# Patient Record
Sex: FEMALE | Race: WHITE | NOT HISPANIC OR LATINO | URBAN - METROPOLITAN AREA
[De-identification: names, ages, dates, MRNs, and addresses within clinical notes are randomized per-mention and may not be internally consistent; named-entity substitution may affect disease eponyms.]

---

## 2018-01-01 ENCOUNTER — OFFICE VISIT CONVERTED (OUTPATIENT)
Dept: OTHER | Facility: HOSPITAL | Age: 83
End: 2018-01-01
Attending: INTERNAL MEDICINE

## 2018-04-19 ENCOUNTER — OFFICE VISIT CONVERTED (OUTPATIENT)
Dept: OTHER | Facility: HOSPITAL | Age: 83
End: 2018-04-19
Attending: INTERNAL MEDICINE

## 2018-05-03 ENCOUNTER — OFFICE VISIT CONVERTED (OUTPATIENT)
Dept: OTHER | Facility: HOSPITAL | Age: 83
End: 2018-05-03
Attending: INTERNAL MEDICINE

## 2018-08-02 ENCOUNTER — OFFICE VISIT CONVERTED (OUTPATIENT)
Dept: OTHER | Facility: HOSPITAL | Age: 83
End: 2018-08-02
Attending: INTERNAL MEDICINE

## 2018-10-25 ENCOUNTER — CONVERSION ENCOUNTER (OUTPATIENT)
Dept: OTHER | Facility: HOSPITAL | Age: 83
End: 2018-10-25

## 2018-10-25 ENCOUNTER — OFFICE VISIT CONVERTED (OUTPATIENT)
Dept: OTHER | Facility: HOSPITAL | Age: 83
End: 2018-10-25
Attending: INTERNAL MEDICINE

## 2018-11-08 ENCOUNTER — OFFICE VISIT CONVERTED (OUTPATIENT)
Dept: OTHER | Facility: HOSPITAL | Age: 83
End: 2018-11-08
Attending: INTERNAL MEDICINE

## 2018-11-29 ENCOUNTER — OFFICE VISIT CONVERTED (OUTPATIENT)
Dept: OTHER | Facility: HOSPITAL | Age: 83
End: 2018-11-29
Attending: INTERNAL MEDICINE

## 2018-11-29 ENCOUNTER — CONVERSION ENCOUNTER (OUTPATIENT)
Dept: OTHER | Facility: HOSPITAL | Age: 83
End: 2018-11-29

## 2019-01-01 ENCOUNTER — OFFICE VISIT CONVERTED (OUTPATIENT)
Dept: OTHER | Facility: HOSPITAL | Age: 84
End: 2019-01-01
Attending: INTERNAL MEDICINE

## 2019-01-01 ENCOUNTER — HOSPITAL ENCOUNTER (OUTPATIENT)
Dept: PET IMAGING | Facility: HOSPITAL | Age: 84
Discharge: HOME | End: 2019-04-16
Attending: INTERNAL MEDICINE

## 2019-01-01 ENCOUNTER — CONVERSION ENCOUNTER (OUTPATIENT)
Dept: OTHER | Facility: HOSPITAL | Age: 84
End: 2019-01-01

## 2021-05-28 VITALS
WEIGHT: 142.6 LBS | HEART RATE: 88 BPM | SYSTOLIC BLOOD PRESSURE: 119 MMHG | TEMPERATURE: 97.4 F | HEIGHT: 63 IN | RESPIRATION RATE: 18 BRPM | HEIGHT: 63 IN | TEMPERATURE: 97.6 F | BODY MASS INDEX: 24.26 KG/M2 | WEIGHT: 130.06 LBS | HEIGHT: 63 IN | HEART RATE: 85 BPM | BODY MASS INDEX: 23.23 KG/M2 | TEMPERATURE: 98.5 F | OXYGEN SATURATION: 96 % | RESPIRATION RATE: 12 BRPM | HEART RATE: 84 BPM | TEMPERATURE: 98.4 F | DIASTOLIC BLOOD PRESSURE: 65 MMHG | DIASTOLIC BLOOD PRESSURE: 68 MMHG | HEIGHT: 63 IN | TEMPERATURE: 98 F | TEMPERATURE: 98.2 F | WEIGHT: 140.3 LBS | RESPIRATION RATE: 18 BRPM | OXYGEN SATURATION: 95 % | RESPIRATION RATE: 18 BRPM | BODY MASS INDEX: 24.27 KG/M2 | HEIGHT: 63 IN | SYSTOLIC BLOOD PRESSURE: 118 MMHG | OXYGEN SATURATION: 97 % | BODY MASS INDEX: 24.13 KG/M2 | WEIGHT: 131.12 LBS | SYSTOLIC BLOOD PRESSURE: 138 MMHG | HEART RATE: 71 BPM | TEMPERATURE: 97.6 F | RESPIRATION RATE: 20 BRPM | BODY MASS INDEX: 22.91 KG/M2 | TEMPERATURE: 96.5 F | BODY MASS INDEX: 25.27 KG/M2 | DIASTOLIC BLOOD PRESSURE: 64 MMHG | SYSTOLIC BLOOD PRESSURE: 148 MMHG | BODY MASS INDEX: 23.75 KG/M2 | DIASTOLIC BLOOD PRESSURE: 78 MMHG | BODY MASS INDEX: 22.15 KG/M2 | OXYGEN SATURATION: 97 % | TEMPERATURE: 98.4 F | OXYGEN SATURATION: 100 % | SYSTOLIC BLOOD PRESSURE: 161 MMHG | TEMPERATURE: 98 F | OXYGEN SATURATION: 97 % | SYSTOLIC BLOOD PRESSURE: 130 MMHG | DIASTOLIC BLOOD PRESSURE: 74 MMHG | TEMPERATURE: 90 F | WEIGHT: 136.9 LBS | WEIGHT: 125 LBS | WEIGHT: 129.31 LBS | WEIGHT: 138.5 LBS | RESPIRATION RATE: 18 BRPM | DIASTOLIC BLOOD PRESSURE: 84 MMHG | HEART RATE: 87 BPM | HEART RATE: 80 BPM | RESPIRATION RATE: 18 BRPM | HEART RATE: 74 BPM | HEART RATE: 72 BPM | SYSTOLIC BLOOD PRESSURE: 134 MMHG | BODY MASS INDEX: 23.04 KG/M2 | BODY MASS INDEX: 24.86 KG/M2 | HEART RATE: 70 BPM | HEIGHT: 63 IN | HEIGHT: 63 IN | HEIGHT: 63 IN | RESPIRATION RATE: 18 BRPM | BODY MASS INDEX: 24.54 KG/M2 | HEART RATE: 75 BPM | HEART RATE: 80 BPM | HEIGHT: 63 IN | DIASTOLIC BLOOD PRESSURE: 72 MMHG | SYSTOLIC BLOOD PRESSURE: 97 MMHG | OXYGEN SATURATION: 96 % | WEIGHT: 136.19 LBS | WEIGHT: 137 LBS | DIASTOLIC BLOOD PRESSURE: 73 MMHG | DIASTOLIC BLOOD PRESSURE: 70 MMHG | OXYGEN SATURATION: 96 % | WEIGHT: 134.06 LBS | SYSTOLIC BLOOD PRESSURE: 130 MMHG | DIASTOLIC BLOOD PRESSURE: 56 MMHG | HEIGHT: 63 IN | OXYGEN SATURATION: 97 % | SYSTOLIC BLOOD PRESSURE: 139 MMHG | OXYGEN SATURATION: 92 % | DIASTOLIC BLOOD PRESSURE: 80 MMHG | SYSTOLIC BLOOD PRESSURE: 125 MMHG

## 2021-05-28 NOTE — PROGRESS NOTES
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  PET/CT scan is ordered to verify recurrence.  Patient's present     with her caregiver.            Patient claims to have burning in her urination and not much urinary volume.      Patient drinks 4 glasses of fluid per day.            Past Med/Surg History            Past Med/Surg History:   Hypertension             No Diabetes Mellitus             Heart Disease (coronary artery disease with stent placement)             No Blood Clots             Cancer (skin cancers)             No Lung Disease             No Kidney Disease             Other (hyperlipidemia)            Social History      Social History:  No Tobacco Use, No Alcohol Use, No Recreational Drug use            Allergies      Coded Allergies:             CHOLESTYRAMINE (Unverified  Allergy, Unknown, 18)           CIPROFLOXACIN (Unverified  Allergy, Unknown, 18)           NITROFURANTOIN (Unverified  Allergy, Unknown, 18)           SUCROSE (Unverified  Allergy, Unknown, 18)            Medications      Medications    Last Reconciled on 18 14:57 by AYAKA POLO MD      Cholestyramine (Cholestyramine*) 4 Gm Powd.pack      4 GM PO BID, #60 PACKET         Reported         5/3/18       Ciprofloxacin Hcl (Ciprofloxacin*) 250 Mg Tablet      250 MG PO BID, TAB 0  Refills         Reported         5/3/18       Abbe Cit/Mag/D3/Zn//Owen/Bor (Citracal-D + Magnesium Tab) 1 Tab Tablet      1 EACH PO QDAY for 30 Days, #30 TAB         Reported         5/3/18       Glucosamine HCl (Glucosamine HCl) 500 Mg Tablet      1000 MG PO QDAY, TAB         Reported         5/22/17       Acetaminophen/Hydrocodone 10/325* (Hydrocodone/Acetaminophen 10/325*) 1 Each     Tablet      1 TAB PO Q6H Y for PAIN, TAB         Reported         5/11/17       Sennosides/Docusate Sod 8.6/50 MG (Senokot-S) 1 Each Tablet      2 TAB PO QDAY, #60 TAB         Reported         5/11/17       Polyethylene Glycol (Miralax*) 17 Gm Packet      17 GM PO QDAY, #30 PKT 0 Refills         Reported         5/11/17       Cholecalciferol (Vitamin D3*) 1,000 Unit Tab      1000 UNITS PO QDAY, #30 TAB 0 Refills         Reported         5/11/17       Magnesium Oxide (Magnesium Oxide) 500 Mg Capsule      2 TAB PO QDAY, CAP         Reported         5/11/17       Nitroglycerin (Nitromist 400 MCG/SPRAY*) 8.5 Gm Spray      0.4 MG SL ASDIR, SPRAYS         Reported         5/11/17       Zolpidem Tartrate (Ambien) 5 Mg Tablet      5 MG PO HS, #30 TAB         Reported         1/29/15       Tramadol (Tramadol) 50 Mg Tablet      50 MG PO QID for PAIN, TAB 0 Refills         Reported         1/29/15       Aspirin ([place] Aspirin*) 81 Mg Tab.chew      81 MG PO QDAY, TAB.CHEW         Reported         1/29/15       Sucralfate (Carafate) 1,000 Mg/10 Ml Oral.susp      1000 MG PO HS, #300 ML         Reported         1/29/15       Carvedilol (Coreg) 6.25 Mg Tablet      6.25 MG PO BID, #60 TAB 0 Refills         Reported         1/29/15       Gemfibrozil (Gemfibrozil*) 600 Mg Tablet      600 MG PO BID, TAB         Reported         1/29/15       Ezetimide (Zetia) 10 Mg Tablet      10 MG PO QDAY, #30 TAB 0 Refills         Reported         1/29/15       Pantoprazole (Protonix*) 40 Mg Tablet.dr      40 MG PO QDAY@07, #30 TAB 0 Refills         Reported          1/29/15      Current Medications      Current Medications Reviewed 5/3/18            Pain Assessment      Pain Intensity:  4            Review of Systems      General:  No Anxiety, Fatigue Scale: (4), Pain Scale: (4), No Fever, No Other      HEENT:  No Dysphagia, No Hearing Changes, No Rhinorrhea, No Tinnitus, No Visual     Changes, No Nasal Congestion, No Epistaxis, No Other      Respiratory:  No Cough, No Shortness of Air, No Sputum Changes, No Wheezing, No     Hemoptysis, No Congestion, No Other      Cardiovascular:  No Chest Pain, No Pedal Edema, No Orthopnea, No Palpitations,     Chest Pressure, No Dizziness, No Other      Gastrointestinal:  No Nausea, No Vomiting, No Dysphagia, Constipation, No     Diarrhea, No Appetite Good, Appetite Fair, No Appetite Poor, No Early Satiety,     No Other      Genitourinary:  Nocturia (2 or f3), Dysuria, No Other      Musculoskeletal:  No Joint Effusions, Joint Tenderness, Joint Stiffness, No     Myalgias, Aches, Pains, No Other      Endocrine:  No Heat Intolerance, No Cold Intolerance, No Fatigue, No Blood     Sugar Control, No Other      Hematologic:  Bleeding, Bruising, No Swollen Glands, No Other      Allergic/Immunologic:  No Hives, No Throat closing off, No Nasal drip, No Itchy     eyes, No Hay fever, No Other      Psychological:  No Anxiety, Depression, No Other      Neurological:  No Headaches, Dizziness, No Weakness, No Numbness, No Other      Skin:  No Rash, No Open Wounds, No Edema, No Other            Vitals      Height 5 ft 3 in / 160.02 cm      Weight 125 lbs 0 oz / 56.55504 kg      BSA 1.59 m2      BMI 22.1 kg/m2      Temperature 97.4 F / 36.33 C - Temporal      Pulse 75      Blood Pressure 134/74 Sitting, Left Arm      Pulse Oximetry 95%, room air            Exam      Constitutional:  No acute distress, Conversant, Pleasant, No Weakness      Cardiovascular:  RRR, Normal PMI, No Peripheral Edema      Lungs:  Clear to Ausculation, Normal Respiratory Effort       Extremities:  No digital cyanosis, Normal gait      Neurological:  Cranial Nerve II-XII, No Focal Sensory deficits      Psychological:  Appropriate affect, Intact judgement, Alert      Skin:  Normal temperature      Radiology Impressions      PET CT scan performed on 04/23/2016 showed stable size but slightly more solid     appearance but the patchy opacity in the apical segment of the left lower lobe     measuring 3.2 x 2.5 cm demonstrating an SUV of 3.7.  No new or enlarging     pulmonary nodule is identified.  No pathologically enlarged intrathoracic lymph     nodes.  Patient logic activity is seen in the base of the brain, and     extraocular muscles, salivary glands, the tongue, and the vocal cords.  No     abnormal FDG activity is seen in the pattern of malignant or metastatic disease     in the rest of the PET/CT scan.            Impression/Problem List      Diffuse large B-cell lymphoma involving the right mandible complete response to     6 courses of R CHOP      Hypertension      Hyperlipidemia      Coronary artery disease      Possible urinary tract infection      Notes      New Medications      * Trimethoprim/Sulfamethoxazole DS (Bactrim /160 MG*) 1 EACH TABLET: 1     TAB PO BID 7 Days #14      * Abbe Cit/Mag/D3/Zn//Owen/Bor (Citracal-D + Magnesium Tab) 1 TAB TABLET: 1     EACH PO QDAY 30 Days #30      * CIPROFLOXACIN HCL (Ciprofloxacin*) 250 MG TABLET: 250 MG PO BID      * CHOLESTYRAMINE (Cholestyramine*) 4 GM POWD.PACK: 4 GM PO BID #60            Plan      Urinalysis with culture if appropriate.  Bactrim DS 1 tablet twice a day for 7     days      CAT scan of the chest to follow-up abnormal density on the PET scan in 3 months      CBC, CMP, LDH in 3 months            Patient Education:        DI for Non-Hodgkin's Lymphoma            PREVENTION      Hx Influenza Vaccination:  Yes (2016)      Hx Pneumococcal Vaccination:  Yes (2015)      Encouraged to follow-up with:  PCP regarding  preventative exams.      Chart initiated by      Nikki Aguilar MA                 Disclaimer: Converted document may not contain table formatting or lab diagrams. Please see [employer] [facility] for the authenticated document.   Young male in NAD

## 2021-05-28 NOTE — PROGRESS NOTES
Patient: YUSUF ORELLANA     Acct: [id number]     Report: #[id number]  UNIT #: [id number]     : 1935    Encounter Date:2018  PRIMARY CARE:   ***Signed***  --------------------------------------------------------------------------------------------------------------------  DATE: 18      Primary Care Provider      Primary Care Provider:  ELDA YEPEZ            Referring Physician      Referring Provider:  ELDA YEPEZ            Chief Complaint      FU NHL, Adenocarcinoma, (L) Lower Lung Lobectomy            Subjective      This is a patient's one-week follow-up.  She had chemotherapy on 2018 and came to see me last week with edema and at 11 pound weight gain.  She     came here today with her caregiver.  She still has some swelling in her legs and    some scattered rashes on legs and arms.  She has a noninfected dry ulcer on the     left leg lateral aspect.  Her weight has gone down 5-6 pounds            Past Med/Surg History            Past Med/Surg History:   Hypertension             No Diabetes Mellitus             Heart Disease (coronary artery disease with stent placement)             No Blood Clots             Cancer (skin cancers)             No Lung Disease             No Kidney Disease             Other (hyperlipidemia)            Social History      Social History:  No Tobacco Use, No Alcohol Use, No Recreational Drug use            Allergies      Coded Allergies:             CHOLESTYRAMINE (Unverified  Allergy, Unknown, 18)           CIPROFLOXACIN (Unverified  Allergy, Unknown, 18)           NITROFURANTOIN (Unverified  Allergy, Unknown, 18)           SUCROSE (Unverified  Allergy, Unknown, 18)            Medications      Medications    Last Reconciled on 18 10:01 by AYAKA POLO MD      Umeclidinium/Vilanterol 62.5-25 Mcg Inh (Anoro Ellipta 62.5-25 Mcg Inh) 1 Each     Blst.w.dev      1 PUFF INH QDAY, #1 INH 0 Refills         Reported          12/20/18       MDI-Albuterol (Proair HFA) 8.5 Gm Hfa.aer.ad      2 PUFFS INH RTQID PRN for SHORTNESS OF BREATH, #1 MDI 0 Refills         Reported         12/20/18       Potassium Chloride (K-Tab*) 10 Meq Tablet.er      10 MEQ PO QDAY, TAB         Reported         12/13/18       Acetaminophen (Tylenol) 325 Mg Tablet      650 MG PO Q4H PRN for PAIN OR FEVER, #100 TAB 0 Refills         Reported         12/13/18       Folic Acid (Folic Acid*) 0.4 Mg Tablet      400 MCG PO QDAY, TAB         Reported         12/13/18       Nitroglycerin (Nitroglycerin 0.8 MG/HR) 1 Each Patch.td24      1 PATCH TRANSDERM QDAY, PATCH         Reported         12/13/18       Aspirin Chew (Aspirin Baby) 81 Mg Tab.chew      81 MG PO QDAY, #30 TAB.CHEW 0 Refills         Reported         12/13/18       Cholecalciferol (Vitamin D*) 2,000 Unit Cap      1000 UNITS PO QDAY, #60 CAP 0 Refills         Reported         12/13/18       Nicotine (Nicotrol) Unknown Strength Spray      IH QDAY PRN PRN for CHEST PAIN, EACH         Reported         12/13/18       Zolpidem Tartrate (Zolpidem Tartrate*) 5 Mg Tablet      5 MG PO HS PRN for SLEEP for 30 Days, #30 TAB 0 Refills         Reported         12/13/18       Ondansetron HCl (Zofran*) 4 Mg Tablet      4 MG PO Q4H PRN for NAUSEA AND/OR VOMITING for 10 Days, #60 TAB 2 Refills         Prov: Lida Sherman         11/29/18       Abbe Cit/Mag/D3/Zn//Owen/Bor (Citracal-D + Magnesium Tab) 1 Tab Tablet      1 EACH PO QDAY for 30 Days, #30 TAB         Reported         5/3/18       Glucosamine HCl (Glucosamine HCl) 500 Mg Tablet      1000 MG PO QDAY, TAB         Reported         5/22/17       Sennosides/Docusate Sod 8.6/50 MG (Senokot-S) 1 Each Tablet      2 TAB PO QDAY, #60 TAB         Reported         5/11/17       Polyethylene Glycol (Miralax*) 17 Gm Packet      17 GM PO QDAY, #30 PKT 0 Refills         Reported         5/11/17       Cholecalciferol (Vitamin D3*) 1,000 Unit Tab      1000 UNITS PO QDAY, #30 TAB 0  Refills         Reported         5/11/17       Magnesium Oxide (Magnesium Oxide) 500 Mg Capsule      2 TAB PO QDAY, CAP         Reported         5/11/17       Tramadol (Tramadol) 50 Mg Tablet      50 MG PO QID for PAIN, TAB 0 Refills         Reported         1/29/15       Sucralfate (Carafate) 1,000 Mg/10 Ml Oral.susp      1000 MG PO HS, #300 ML         Reported         1/29/15       Carvedilol (Coreg) 6.25 Mg Tablet      6.25 MG PO BID, #60 TAB 0 Refills         Reported         1/29/15       Gemfibrozil (Gemfibrozil*) 600 Mg Tablet      600 MG PO BID, TAB         Reported         1/29/15       Ezetimide (Zetia) 10 Mg Tablet      10 MG PO QDAY, #30 TAB 0 Refills         Reported         1/29/15       Pantoprazole (Protonix*) 40 Mg Tablet.dr      40 MG PO QDAY@07, #30 TAB 0 Refills         Reported         1/29/15      Current Medications      Current Medications Reviewed 12/20/18       RADHA Araujo MA            Pain Assessment      Pain Intensity:  5      Description:  Aching, Sore      Duration:  Continuous            Review of Systems      General:  Anxiety, Fatigue Scale: (5), Pain Scale: (5)      HEENT:  No Dysphagia, No Hearing Changes      Respiratory:  No Cough; Shortness of Air      Cardiovascular:  No Chest Pain, No Pedal Edema      Gastrointestinal:  Nausea, Appetite Good (good)      Genitourinary:  Nocturia (2x)      Musculoskeletal:  No Joint Effusions; Aches, Pains ((L) Hip)      Endocrine:  No Heat Intolerance      Hematologic:  No Bleeding, No Bruising      Allergic/Immunologic:  No Hives, No Throat closing off      Psychological:  Anxiety; No Depression      Neurological:  Headaches, Dizziness, Weakness, Numbness (legs)      Skin:  Rash (Itchy sore rash on arms and legs)      Review of Systems      RADHA Araujo MA            Vitals      Height 5 ft 3 in / 160.02 cm      Weight 137 lbs 0 oz / 62.310554 kg      BSA 1.65 m2      BMI 24.3 kg/m2      Temperature 98.0 F / 36.67 C      Pulse 87       Respirations 18      Blood Pressure 125/73      Pulse Oximetry 96%            Exam      Constitutional:  No acute distress, Conversant, Pleasant      Eyes:  Anicteric sclerae, Palpebral Conjunctivae (Pink), KELLY, Other (Glasses)      HENT:  Oropharynx clear; No Erythema; Buccal mucosae (Pink), Other (Hard of h    earing)      Neck:  Supple, Full Range of Motion; No Masses      Cardiovascular:  RRR; No Murmurs; Normal PMI; No Peripheral Edema      Lungs:  Clear to Ausculation, Normal Respiratory Effort      Abdomen:  Soft, NABS; No Masses, No Tenderness      Chest:  Other (Symmetrical and equal)      Lymphatic:  No Neck      Extremities:  No digital cyanosis, No digital ischemia, Normal gait, Other (No     deformity)      Neurological:  Cranial Nerve II-XII; No Focal Sensory deficits      Psychological:  Appropriate affect, Appropriate mood, Intact judgement, Alert      Skin:  Other (Rashes are less erythematous, dry ulcer on the left leg)            Lab Results      Potassium 4.9, H        Impression/Problem List      Diffuse large B-cell lymphoma involving the right mandible complete response to     6 courses of R CHOP no evidence of disease      Hypertension      Hyperlipidemia      Coronary artery disease      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 on     active chemotherapy      Rashes, improved      Weakness slightly improved      Fluid overload possible congestive heart failure/patient had a 5 to 6 pound     weight loss with Lasix and potassium      Hard of hearing      Generalized aching resolved      Dry ulcer left leg      Notes      New Medications      * MDI-Albuterol (Proair HFA) 8.5 GM HFA.AER.AD: 2 PUFFS INH RTQID PRN SHORTNESS       OF BREATH #1      * Umeclidinium/Vilanterol 62.5-25 Mcg Inh (Anoro Ellipta 62.5-25 Mcg Inh) 1 EACH      BLST.W.DEV: 1 PUFF INH QDAY #1      * Furosemide 20 MG TABLET: 20 MG PO QDAY 90 Days #90      Changed Medications      * POTASSIUM  CHLORIDE (K-Tab*) 10 MEQ TABLET.ER:         From: 10 MEQ PO QDAY         To: 10 MEQ PO BID 60 Days #120            Plan      Patient to continue her Lasix which was initially given only for 5 days and     potassium chloride 2 times daily.      Continue to hold chemo      Follow-up after the holidays.            Patient Education:        Non-Hodgkin Lymphoma-Adult            PREVENTION      Hx Influenza Vaccination:  Yes (2016)      Date Influenza Vaccine Given:  Oct 17, 2017      Influenza Vaccine Declined:  No      2 or More Falls Past Year?:  No      Fall Past Year with Injury?:  No      Hx Pneumococcal Vaccination:  Yes (2015)      Encouraged to follow-up with:  PCP regarding preventative exams.      Chart initiated by      RADHA Araujo MA                 Disclaimer: Converted document may not contain table formatting or lab diagrams. Please see [employer] [facility] [employer] [facility] for the authenticated document.

## 2021-05-28 NOTE — PROGRESS NOTES
Patient: YUSFU ORELLANA     Acct: [id number]     Report: #[id number]  UNIT #: [id number]     : 1935    Encounter Date:2019  PRIMARY CARE:   ***Signed***  --------------------------------------------------------------------------------------------------------------------  DATE: 19      Primary Care Provider      Primary Care Provider:  ELDA YEPEZ            Referring Physician      Referring Provider:  ELDA YEPEZ            Chief Complaint      FU NHL, Adenocarcinoma            Subjective      83-year-old white female was diagnosed in 2018 with adenocarcinoma of    the lung left side status post left lower lobectomy.  She had one perihilar     lymph node positive.  Patient was offered adjuvant chemotherapy and was given 1     course of carboplatin and Alimta in 2018.  Unfortunately the     patient did not tolerate the treatment and further treatments were discontinued.            Patient has a history of aggressive non-Hodgkin's lymphoma in May 2018 and     received 6 courses of R-CHOP with complete response.            Patient is here for results of a PET/CT scan that was done on 2019.            Patient complains of burning in her stomach.  Her arms from shoulder to lower     arm just hurts.  Patient has skin cancers removed.            Past Med/Surg History            Past Med/Surg History:   Hypertension             No Diabetes Mellitus             Heart Disease (coronary artery disease with stent placement)             No Blood Clots             Cancer (Progressive non-Hodgkin's lymphoma diagnosed in 2017, adenocarcinoma of    the lung left side diagnosed in 2018)             No Lung Disease             No Kidney Disease             Other (hyperlipidemia)            Social History      Social History:  No Tobacco Use, No Alcohol Use, No Recreational Drug use            Allergies      Coded Allergies:             CHOLESTYRAMINE (Unverified  Allergy,  Unknown, 4/19/18)           CIPROFLOXACIN (Unverified  Allergy, Unknown, 4/19/18)           NITROFURANTOIN (Unverified  Allergy, Unknown, 4/19/18)           SUCROSE (Unverified  Allergy, Unknown, 4/19/18)            Medications      Medications    Last Reconciled on 4/29/19 12:29 by AYAKA SHERMAN MD      Losartan Potassium (Losartan*) 50 Mg Tablet      50 MG PO QDAY, #30 TAB 0 Refills         Reported         4/11/19       Nitroglycerin (Nitromist 400 MCG/SPRAY) Unknown Strength Spray               Reported         1/10/19       Furosemide (Furosemide) 20 Mg Tablet      20 MG PO QDAY for 90 Days, #90 TAB 1 Refill         Prov: [place]         12/20/18       Potassium Chloride (K-Tab*) 10 Meq Tablet.er      10 MEQ PO BID for 60 Days, #120 TAB 1 Refill         Prov: Tommie Shermanazon         12/20/18       MDI-Albuterol (Proair HFA) 8.5 Gm Hfa.aer.ad      2 PUFFS INH RTQID PRN for SHORTNESS OF BREATH, #1 MDI 0 Refills         Reported         12/20/18       Acetaminophen (Tylenol) 325 Mg Tablet      650 MG PO Q4H PRN for PAIN OR FEVER, #100 TAB 0 Refills         Reported         12/13/18       Folic Acid (Folic Acid*) 0.4 Mg Tablet      400 MCG PO QDAY, TAB         Reported         12/13/18       Nitroglycerin (Nitroglycerin 0.8 MG/HR) 1 Each Patch.td24      1 PATCH TRANSDERM QDAY, PATCH         Reported         12/13/18       Aspirin Chew (Aspirin Baby) 81 Mg Tab.chew      81 MG PO QDAY, #30 TAB.CHEW 0 Refills         Reported         12/13/18       Cholecalciferol (Vitamin D3*) 2,000 Unit Cap      1000 UNITS PO QDAY, #60 CAP 0 Refills         Reported         12/13/18       Zolpidem Tartrate (Zolpidem Tartrate*) 5 Mg Tablet      5 MG PO HS PRN for SLEEP for 30 Days, #30 TAB 0 Refills         Reported         12/13/18       Ondansetron Hcl (ONDANSETRON HCL) 4 Mg Tablet      4 MG PO Q4H PRN for NAUSEA AND/OR VOMITING for 10 Days, #60 TAB 2 Refills         Prov: Tommie Shermanazon         11/29/18       Abbe  Cit/Mag/D3/Zn//Owen/Bor (Citracal-D + Magnesium Tab) 1 Tab Tablet      1 EACH PO QDAY for 30 Days, #30 TAB         Reported         5/3/18       Glucosamine HCl (Glucosamine HCl) 500 Mg Tablet      1000 MG PO QDAY, TAB         Reported         5/22/17       Sennosides/Docusate Sod 8.6/50 MG (Senokot-S) 1 Each Tablet      2 TAB PO QDAY, #60 TAB         Reported         5/11/17       Polyethylene Glycol (Miralax*) 17 Gm Packet      17 GM PO QDAY, #30 PKT 0 Refills         Reported         5/11/17       Cholecalciferol (Vitamin D3*) 1,000 Unit Tab      1000 UNITS PO QDAY, #30 TAB 0 Refills         Reported         5/11/17       Magnesium Oxide (Magnesium Oxide) 500 Mg Capsule      2 TAB PO QDAY, CAP         Reported         5/11/17       Tramadol (Tramadol) 50 Mg Tablet      50 MG PO QID for PAIN, TAB 0 Refills         Reported         1/29/15       Sucralfate (Carafate) 1,000 Mg/10 Ml Oral.susp      1000 MG PO HS, #300 ML         Reported         1/29/15       Carvedilol (Coreg) 6.25 Mg Tablet      6.25 MG PO BID, #60 TAB 0 Refills         Reported         1/29/15       Gemfibrozil (Gemfibrozil*) 600 Mg Tablet      600 MG PO BID, TAB         Reported         1/29/15       Ezetimide (Zetia) 10 Mg Tablet      10 MG PO QDAY, #30 TAB 0 Refills         Reported         1/29/15       Pantoprazole (Protonix*) 40 Mg Tablet.dr      40 MG PO QDAY@07, #30 TAB 0 Refills         Reported         1/29/15      Current Medications      Current Medications Reviewed 4/25/19            Pain Assessment      Pain Intensity:  5      Description:  Aching, Sore      Duration:  Continuous            Review of Systems      General:  Anxiety      HEENT:  No Dysphagia, No Hearing Changes      Respiratory:  No Cough; Shortness of Air      Cardiovascular:  No Chest Pain      Gastrointestinal:  No Nausea, No Vomiting; Appetite Fair (fair)      Genitourinary:  Nocturia (3x)      Musculoskeletal:  No Joint Effusions; Aches, Pains (back, arms,  hips)      Endocrine:  No Heat Intolerance      Hematologic:  No Bleeding, No Bruising      Allergic/Immunologic:  No Hives      Psychological:  Anxiety; No Depression      Neurological:  Headaches, Dizziness, Weakness, Numbness (toes), Other (poor     balance)      Skin:  No Rash            Vitals      Height 5 ft 3 in / 160.02 cm      Weight 140 lbs 4.8 oz / 63.257550 kg      BSA 1.66 m2      BMI 24.9 kg/m2      Temperature 97.6 F / 36.44 C      Pulse 74      Respirations 12      Blood Pressure 139/72      Pulse Oximetry 96%            Exam      Constitutional:  No acute distress, Pleasant      Eyes:  Anicteric sclerae, Palpebral Conjunctivae (Pink), KELLY, Other (Glasses)      HENT:  Oropharynx clear; No Erythema; Buccal mucosae (Pink)      Neck:  Supple, Full Range of Motion; No Masses      Cardiovascular:  RRR; No Murmurs; Normal PMI; No Peripheral Edema      Lungs:  Clear to Ausculation, Normal Respiratory Effort      Abdomen:  Soft, NABS; No Masses, No Tenderness      Chest:  Other (Symmetrical and equal)      Lymphatic:  No Neck, No Axillae      Extremities:  No digital cyanosis, No digital ischemia, Normal gait, Other (No     deformity)      Neurological:  Cranial Nerve II-XII; No Focal Sensory deficits      Psychological:  Appropriate affect, Appropriate mood, Intact judgement, Alert      Skin:  Other (No dermatosis)            Lab Results      CEA 3      Radiology Impressions      PET CT scan done on April 16, 2019 showed right maxillary sinus masses resolved.     No evidence of hypermetabolic pulmonary mass, hypermetabolic adenopathy or     pleural effusion.  Moderate emphysema is present.  There is coronary artery     calcification.  No pathologic activity.  Abdomen no pathologic activity pelvis     no pathologic PDG activity.  Bones no pathologic PDG activity.      Large hiatal hernia with mild increased metabolic activity in the thoracic p    ortion of the stomach endoscopy is suggested.  No evidence  of abnormal     hypermetabolic activity to suggest recurrent or metastatic disease.            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  No evidence of disease      Hard of hearing            Plan      Advised endoscopy.      6 months checkup with CBC, CMP, CEA, LDH level            Patient Education:        DI for Non-Hodgkin's Lymphoma            PREVENTION      Date Influenza Vaccine Given:  Oct 17, 2017      Influenza Vaccine Declined:  No      2 or More Falls Past Year?:  No      Fall Past Year with Injury?:  No      Encouraged to follow-up with:  PCP regarding preventative exams.      Chart initiated by      RADHA Araujo MA                 Disclaimer: Converted document may not contain table formatting or lab diagrams. Please see [email or web address] for the authenticated document.

## 2021-05-28 NOTE — PROGRESS NOTES
Patient: YUSUF ORELLANA     Acct: [id number]     Report: #[id number]  UNIT #: [id number]     : 1935    Encounter Date:2018  PRIMARY CARE:   ***Signed***  --------------------------------------------------------------------------------------------------------------------  DATE: 18      Primary Care Provider      Primary Care Provider:  ELDA YEPEZ            Referring Physician      Referring Provider:  ELDA YEPEZ            Chief Complaint      Follow up NHL            Subjective      82-year-old white female has a history of diffuse large B-cell lymphoma of the     maxillary sinus status post chemotherapy with complete response.  PET CT scan     was done because of the presence of a left axillary lymph node.  Her PET CT     scan showed stable size but slightly more solid appearance of the patchy     opacity in the apical segment of the left lower lobe measuring 3.2 x 2.5 cm     with an SUV of 3.7.  A chest CT scan was done to follow this up per patient's     request.  Patient had CT of the chest on 2018 and she is here for results.      Patient complains of upper back pain.  She also complains about her teeth     bothering her.            Past Med/Surg History            Past Med/Surg History:   Hypertension             No Diabetes Mellitus             Heart Disease (coronary artery disease with stent placement)             No Blood Clots             Cancer (skin cancers)             No Lung Disease             No Kidney Disease             Other (hyperlipidemia)            Social History      Social History:  No Tobacco Use, No Alcohol Use, No Recreational Drug use            Allergies      Coded Allergies:             CHOLESTYRAMINE (Unverified  Allergy, Unknown, 18)           CIPROFLOXACIN (Unverified  Allergy, Unknown, 18)           NITROFURANTOIN (Unverified  Allergy, Unknown, 18)           SUCROSE (Unverified  Allergy, Unknown, 18)            Medications       Medications    Last Reconciled on 8/8/18 15:48 by AYAKA POLO MD      Cholestyramine (Cholestyramine*) 4 Gm Powd.pack      4 GM PO BID, #60 PACKET         Reported         5/3/18       Ciprofloxacin Hcl (Ciprofloxacin*) 250 Mg Tablet      250 MG PO BID, TAB 0 Refills         Reported         5/3/18       Abbe Cit/Mag/D3/Zn//Owen/Bor (Citracal-D + Magnesium Tab) 1 Tab Tablet      1 EACH PO QDAY for 30 Days, #30 TAB         Reported         5/3/18       Glucosamine HCl (Glucosamine HCl) 500 Mg Tablet      1000 MG PO QDAY, TAB         Reported         5/22/17       Acetaminophen/Hydrocodone 10/325 (Hydrocodone/Acetaminophen 10/325) 1 Each     Tablet      1 TAB PO Q6H Y for PAIN, TAB         Reported         5/11/17       Sennosides/Docusate Sod 8.6/50 MG (Senokot-S) 1 Each Tablet      2 TAB PO QDAY, #60 TAB         Reported         5/11/17       Polyethylene Glycol (Miralax*) 17 Gm Packet      17 GM PO QDAY, #30 PKT 0 Refills         Reported         5/11/17       Cholecalciferol (Vitamin D3*) 1,000 Unit Tab      1000 UNITS PO QDAY, #30 TAB 0 Refills         Reported         5/11/17       Magnesium Oxide (Magnesium Oxide) 500 Mg Capsule      2 TAB PO QDAY, CAP         Reported         5/11/17       Nitroglycerin (Nitromist 400 MCG/SPRAY) 8.5 Gm Spray      0.4 MG SL ASDIR, SPRAYS         Reported         5/11/17       Zolpidem Tartrate (Ambien) 5 Mg Tablet      5 MG PO HS, #30 TAB         Reported         1/29/15       Tramadol (Tramadol) 50 Mg Tablet      50 MG PO QID for PAIN, TAB 0 Refills         Reported         1/29/15       Aspirin ([place] Aspirin*) 81 Mg Tab.chew      81 MG PO QDAY, TAB.CHEW         Reported         1/29/15       Sucralfate (Carafate) 1,000 Mg/10 Ml Oral.susp      1000 MG PO HS, #300 ML         Reported         1/29/15       Carvedilol (Coreg) 6.25 Mg Tablet      6.25 MG PO BID, #60 TAB 0 Refills         Reported         1/29/15       Gemfibrozil (Gemfibrozil*) 600 Mg Tablet      600 MG  PO BID, TAB         Reported         1/29/15       Ezetimide (Zetia) 10 Mg Tablet      10 MG PO QDAY, #30 TAB 0 Refills         Reported         1/29/15       Pantoprazole (Protonix*) 40 Mg Tablet.dr      40 MG PO QDAY@07, #30 TAB 0 Refills         Reported         1/29/15      Current Medications      Current Medications Reviewed 8/2/18            Pain Assessment      Pain Intensity:  4      Duration:  Intermittent            Review of Systems      General:  No Anxiety, Fatigue Scale: (5), Pain Scale: (4), No Fever, No Other      HEENT:  No Dysphagia, No Hearing Changes, No Rhinorrhea, No Tinnitus, No Visual     Changes, No Nasal Congestion, No Epistaxis, No Other      Respiratory:  No Cough, No Shortness of Air, No Sputum Changes, No Wheezing, No     Hemoptysis, No Congestion, No Other      Cardiovascular:  No Chest Pain, No Pedal Edema, No Orthopnea, No Palpitations,     No Chest Pressure, No Dizziness, No Other      Gastrointestinal:  No Nausea, No Vomiting, No Dysphagia, Constipation, No     Diarrhea, Appetite Good, No Appetite Fair, No Appetite Poor, No Early Satiety,     No Other      Genitourinary:  No Nocturia, No Dysuria, No Other      Musculoskeletal:  No Joint Effusions, No Joint Tenderness, No Joint Stiffness,     No Myalgias, No Aches, No Pains, No Other      Endocrine:  No Heat Intolerance, No Cold Intolerance, No Fatigue, No Blood     Sugar Control, No Other      Allergic/Immunologic:  No Hives, No Throat closing off, No Nasal drip, No Itchy     eyes, No Hay fever, No Other      Psychological:  No Anxiety, No Depression, No Other      Neurological:  Headaches, No Dizziness, Weakness, Numbness (legs), No Other      Skin:  No Rash, No Open Wounds, No Edema, No Other            Vitals      Height 5 ft 3 in / 160.02 cm      Weight 130 lbs 1 oz / 58.109392 kg      BSA 1.63 m2      BMI 23.0 kg/m2      Temperature 98.0 F / 36.67 C - Temporal      Pulse 70      Blood Pressure 130/70 Sitting, Left Arm       Pulse Oximetry 96%, room air            Exam      Constitutional:  No acute distress, Conversant, Pleasant      Eyes:  Anicteric sclerae, Palpebral Conjunctivae ( pink), KELLY      HENT:  Oropharynx clear, No Erythema, Buccal mucosae (pink), Other (hard of     hearing)      Neck:  Supple, Full Range of Motion      Cardiovascular:  RRR, No Murmurs, Normal PMI, No Peripheral Edema      Lungs:  Clear to Ausculation, Normal Respiratory Effort      Abdomen:  Soft, NABS, No Masses, No Tenderness      Chest:  Other (symmetrical and equal)      Lymphatic:  No Neck      Extremities:  No digital cyanosis, Normal gait, Other (no deformity no     limitation in range of motion)      Neurological:  Cranial Nerve II-XII, No Focal Sensory deficits      Psychological:  Appropriate affect, Alert, Oriented to person      Skin:  Normal temperature, Other (no dermatosis)      Radiology Impressions      CAT scan of the chest showed moderate emphysema with a 3 cm left lower lobe     necrotic lung mass consistent with bronchogenic carcinoma.  No demonstrated     pleural abnormality.  Large hiatal hernia with majority of the stomach     displacing to the chest.  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  CT chest showed a 3 cm mass with     central necrosis.  Rule out bronchogenic CA            Plan      Repeat PET CT scan      Refer to Dr. Juan Antonio Valladares for evaluation and treatment      CBC, CMP, CEA, LDH, PT PTT.      Discussed CT scan findings with the patient.  This can be a neoplasm or an     infection.            Patient Education:        DI for Non-Hodgkin's Lymphoma             PREVENTION      Hx Influenza Vaccination:  Yes (2016)      Date Influenza Vaccine Given:  Oct 17, 2017      Influenza Vaccine Declined:  No      Hx Pneumococcal Vaccination:  Yes (2015)      Encouraged to follow-up with:  PCP regarding preventative exams.      Chart initiated by      Nikki Aguilar MA                 Disclaimer: Converted document may not contain table formatting or lab diagrams. Please see [employer] [facility] for the authenticated document.

## 2021-05-28 NOTE — PROGRESS NOTES
Patient: YUSUF ORELLANA     Acct: [id number]     Report: #[id number]  UNIT #: [id number]     : 1935    Encounter Date:2018  PRIMARY CARE:   ***Signed***  --------------------------------------------------------------------------------------------------------------------  DATE: 18      Primary Care Provider      Primary Care Provider:  ELDA YEPEZ            Referring Physician      Referring Provider:  ELDA YEPEZ            Chief Complaint      Follow up NHL      Recent diagnosis of adenocarcinoma of the lung status post left lower lobectomy            Subjective      83-year-old white female was diagnosed with adenocarcinoma of the lung status     post left lower lobectomy with 1 peribronchial lymph nodes positive for me    tastases on her last visit she was offered chemotherapy as adjuvant treatment     and she wanted to think about it.  Side effects of the chemotherapy has been     discussed.  She comes in today for chemotherapy however patient has been having     sore throat for the past few days.  She had fever of 101 last night and had gone    to the emergency room.  She was told she had a virus and was given Z-Leonel.            Past Med/Surg History            Past Med/Surg History:   Hypertension             No Diabetes Mellitus             Heart Disease (coronary artery disease with stent placement)             No Blood Clots             Cancer (skin cancers)             No Lung Disease             No Kidney Disease             Other (hyperlipidemia)            Social History      Social History:  No Tobacco Use, No Alcohol Use, No Recreational Drug use            Allergies      Coded Allergies:             CHOLESTYRAMINE (Unverified  Allergy, Unknown, 18)           CIPROFLOXACIN (Unverified  Allergy, Unknown, 18)           NITROFURANTOIN (Unverified  Allergy, Unknown, 18)           SUCROSE (Unverified  Allergy, Unknown, 18)            Medications       Medications    Last Reconciled on 11/28/18 10:01 by AYAKA POLO MD      Cholestyramine (CHOLESTYRAMINE) 4 Gm Powd.pack      4 GM PO BID, #60 PACKET         Reported         5/3/18       Ciprofloxacin HCl (Ciprofloxacin HCl) 250 Mg Tablet      250 MG PO BID, TAB 0 Refills         Reported         5/3/18       Abbe Cit/Mag/D3/Zn//Owen/Bor (Citracal-D + Magnesium Tab) 1 Tab Tablet      1 EACH PO QDAY for 30 Days, #30 TAB         Reported         5/3/18       Glucosamine HCl (Glucosamine HCl) 500 Mg Tablet      1000 MG PO QDAY, TAB         Reported         5/22/17       Acetaminophen/Hydrocodone 10/325 (Hydrocodone/Acetaminophen 10/325) 1 Each     Tablet      1 TAB PO Q6H PRN for PAIN, TAB         Reported         5/11/17       Sennosides/Docusate Sod 8.6/50 MG (Senokot-S) 1 Each Tablet      2 TAB PO QDAY, #60 TAB         Reported         5/11/17       Polyethylene Glycol (Miralax*) 17 Gm Packet      17 GM PO QDAY, #30 PKT 0 Refills         Reported         5/11/17       Cholecalciferol (Vitamin D3*) 1,000 Unit Tab      1000 UNITS PO QDAY, #30 TAB 0 Refills         Reported         5/11/17       Magnesium Oxide (Magnesium Oxide) 500 Mg Capsule      2 TAB PO QDAY, CAP         Reported         5/11/17       Nitroglycerin (Nitromist 400 MCG/SPRAY) 8.5 Gm Spray      0.4 MG SL ASDIR, SPRAYS         Reported         5/11/17       Zolpidem Tartrate (Ambien) 5 Mg Tablet      5 MG PO HS, #30 TAB         Reported         1/29/15       Tramadol (Tramadol) 50 Mg Tablet      50 MG PO QID for PAIN, TAB 0 Refills         Reported         1/29/15       Aspirin Chew (Aspirin Chew) 81 Mg Tab.chew      81 MG PO QDAY, TAB.CHEW         Reported         1/29/15       Sucralfate (Carafate) 1,000 Mg/10 Ml Oral.susp      1000 MG PO HS, #300 ML         Reported         1/29/15       Carvedilol (Coreg) 6.25 Mg Tablet      6.25 MG PO BID, #60 TAB 0 Refills         Reported         1/29/15       Gemfibrozil (Gemfibrozil*) 600 Mg Tablet      600  MG PO BID, TAB         Reported         1/29/15       Ezetimide (Zetia) 10 Mg Tablet      10 MG PO QDAY, #30 TAB 0 Refills         Reported         1/29/15       Pantoprazole (Protonix*) 40 Mg Tablet.dr      40 MG PO QDAY@07, #30 TAB 0 Refills         Reported         1/29/15      Current Medications      Current Medications Reviewed 11/08/18            Pain Assessment      Pain Intensity:  5            Review of Systems      General:  Fatigue Scale: (5), Pain Scale: (5)      HEENT:  Other (Hoarse)      Respiratory:  Shortness of Air      Gastrointestinal:  Appetite Fair            Vitals      Height 5 ft 3.00 in / 160.02 cm      Weight 136 lbs 3.000 oz / 61.272981 kg      BSA 1.64 m2      BMI 24.1 kg/m2      Temperature 96.5 F / 35.83 C      Pulse 72      Respirations 18      Blood Pressure 130/56            Exam      Constitutional:  No acute distress, Conversant, Pleasant, Other (Tired looking)      Eyes:  Anicteric sclerae, Palpebral Conjunctivae (Pink), KELLY      HENT:  Buccal mucosae (pink)      Neck:  Supple, Full Range of Motion      Cardiovascular:  RRR; No Murmurs; Normal PMI; No Peripheral Edema      Lungs:  Clear to Ausculation, Normal Respiratory Effort      Abdomen:  Soft, NABS; No Tenderness      Chest:  Other (Symmetrical and equal )      Lymphatic:  No Neck, No Axillae      Extremities:  No digital cyanosis, No digital ischemia, Normal gait, Other (No     deformity)      Neurological:  Cranial Nerve II-XII; No Focal Sensory deficits      Psychological:  Appropriate affect, Appropriate mood, Intact judgement, Alert      Skin:  Normal temperature, Other (No dermatosis)            Lab Results      WBC 10.4, hemoglobin 10.7, hematocrit 32.2, platelet count 270 thousand            Impression/Problem List      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  May return on November 29 if she considers to get     chemotherapy      I did an extensive and prolonged discussion with Ms. Zhou about adjuvant     chemotherapy with or without immunotherapy.  Part of  the reason is her advanced    age but patient is physically fit.  Patient also thinks that she will not live     for another 5 years.            PREVENTION      Hx Influenza Vaccination:  Yes (2016)      Date Influenza Vaccine Given:  Oct 17, 2017      Influenza Vaccine Declined:  No      Hx Pneumococcal Vaccination:  Yes (2015)      Encouraged to follow-up with:  PCP regarding preventative exams.                 Disclaimer: Converted document may not contain table formatting or lab diagrams. Please see [employer] [facility] for the authenticated document.

## 2021-05-28 NOTE — PROGRESS NOTES
Patient: YUSUF ORELLANA     Acct: [id number]     Report: #[id number]  UNIT #: [id number]     : 1935    Encounter Date:10/25/2018  PRIMARY CARE:   ***Signed***  --------------------------------------------------------------------------------------------------------------------  DATE: 10/29/18      Primary Care Provider      Primary Care Provider:  ELDA YEPEZ            Referring Physician      Referring Provider:  ELDA YEPEZ            Chief Complaint      Follow up NHL      Recent diagnosis of adenocarcinoma of the lung status post left lower lobectomy            Subjective      83-year-old white female came in for consultation.  Patient was recently     diagnosed with adenocarcinoma of the lung status post lobectomy, left lower lobe    with 1 peribronchial lymph nodes positive for metastases.            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      Patient claims that she is doing fine.  She has occasional pain on her incision     site.  She was told to follow-up with me            Past Med/Surg History            Past Med/Surg History:   Hypertension             No Diabetes Mellitus             Heart Disease (coronary artery disease with stent placement)             No Blood Clots             Cancer (skin cancers)             No Lung Disease             No Kidney Disease             Other (hyperlipidemia)            Social History      Social History:  No Tobacco Use, No Alcohol Use, No Recreational Drug use            Allergies      Coded Allergies:             CHOLESTYRAMINE (Unverified  Allergy, Unknown, 18)           CIPROFLOXACIN (Unverified  Allergy, Unknown, 18)           NITROFURANTOIN (Unverified  Allergy, Unknown, 18)           SUCROSE (Unverified  Allergy, Unknown, 18)            Medications      Medications    Last Reconciled on 10/29/18 11:43 by AYAKA POLO MD      Cholestyramine  (CHOLESTYRAMINE) 4 Gm Powd.pack      4 GM PO BID, #60 PACKET         Reported         5/3/18       Ciprofloxacin HCl (Ciprofloxacin HCl) 250 Mg Tablet      250 MG PO BID, TAB 0 Refills         Reported         5/3/18       Abbe Cit/Mag/D3/Zn//Owen/Bor (Citracal-D + Magnesium Tab) 1 Tab Tablet      1 EACH PO QDAY for 30 Days, #30 TAB         Reported         5/3/18       Glucosamine HCl (Glucosamine HCl) 500 Mg Tablet      1000 MG PO QDAY, TAB         Reported         5/22/17       Acetaminophen/Hydrocodone 10/325 (Hydrocodone/Acetaminophen 10/325) 1 Each     Tablet      1 TAB PO Q6H PRN for PAIN, TAB         Reported         5/11/17       Sennosides/Docusate Sod 8.6/50 MG (Senokot-S) 1 Each Tablet      2 TAB PO QDAY, #60 TAB         Reported         5/11/17       Polyethylene Glycol (Miralax*) 17 Gm Packet      17 GM PO QDAY, #30 PKT 0 Refills         Reported         5/11/17       Cholecalciferol (Vitamin D3*) 1,000 Unit Tab      1000 UNITS PO QDAY, #30 TAB 0 Refills         Reported         5/11/17       Magnesium Oxide (Magnesium Oxide) 500 Mg Capsule      2 TAB PO QDAY, CAP         Reported         5/11/17       Nitroglycerin (Nitromist 400 MCG/SPRAY) 8.5 Gm Spray      0.4 MG SL ASDIR, SPRAYS         Reported         5/11/17       Zolpidem Tartrate (Ambien) 5 Mg Tablet      5 MG PO HS, #30 TAB         Reported         1/29/15       Tramadol (Tramadol) 50 Mg Tablet      50 MG PO QID for PAIN, TAB 0 Refills         Reported         1/29/15       Aspirin Chew (Aspirin Chew) 81 Mg Tab.chew      81 MG PO QDAY, TAB.CHEW         Reported         1/29/15       Sucralfate (Carafate) 1,000 Mg/10 Ml Oral.susp      1000 MG PO HS, #300 ML         Reported         1/29/15       Carvedilol (Coreg) 6.25 Mg Tablet      6.25 MG PO BID, #60 TAB 0 Refills         Reported         1/29/15       Gemfibrozil (Gemfibrozil*) 600 Mg Tablet      600 MG PO BID, TAB         Reported         1/29/15       Ezetimide (Zetia) 10 Mg Tablet       10 MG PO QDAY, #30 TAB 0 Refills         Reported         1/29/15       Pantoprazole (Protonix*) 40 Mg Tablet.dr      40 MG PO QDAY@07, #30 TAB 0 Refills         Reported         1/29/15      Current Medications      Current Medications Reviewed 10/29/18            Pain Assessment      Pain Intensity:  3            Review of Systems      General:  Fatigue Scale: (4 out of), Pain Scale: (3 out of 10)      HEENT:  Hearing Changes (Heart of hearing)      Respiratory:  Shortness of Air      Gastrointestinal:  Nausea (Morning), Appetite Fair      Musculoskeletal:  Aches (All of), Pains (Incision site)            Vitals      Height 5 ft 3.00 in / 160.02 cm      Weight 134 lbs 1.000 oz / 60.218627 kg      BSA 1.63 m2      BMI 23.7 kg/m2      Temperature 90 F / 32.22 C      Pulse 85      Respirations 18      Blood Pressure 138/78      Pulse Oximetry 97%      Comment: Done by MA            Exam      Constitutional:  No acute distress, Conversant, Pleasant      Eyes:  Anicteric sclerae, Palpebral Conjunctivae (Pink), KELLY      HENT:  Oropharynx clear; No Erythema; Buccal mucosae (Pink)      Neck:  Supple, Full Range of Motion      Cardiovascular:  RRR; No Murmurs; Normal PMI; No Peripheral Edema      Lungs:  Clear to Ausculation, Normal Respiratory Effort; No Rhonchi, No     Crackles, No Wheezes; Other (Incision sites are healing)      Abdomen:  Soft, NABS; No Tenderness      Chest:  Other (Symmetrical and equal)      Lymphatic:  No Neck      Extremities:  No digital cyanosis, No digital ischemia, Normal gait (No defor    mity no limitation range of motion)      Neurological:  Cranial Nerve II-XII; No Focal Sensory deficits      Psychological:  Appropriate affect, Appropriate mood, Intact judgement, Alert      Skin:  Normal temperature, Other (No dermatosis)            Impression/Problem List      Diffuse large B-cell lymphoma involving the right mandible complete response to     6 courses of R CHOP      Hypertension       Hyperlipidemia      Coronary artery disease      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            Plan      Obtain operative report and path report from Dr. Valladares      I did an extensive and prolonged discussion with Ms. Zhou about adjuvant     chemotherapy with or without immunotherapy.  Part of  the reason is her advanced    age but patient is physically fit.  Patient also thinks that she will not live     for another 5 years.      Patient will think about getting treatment.  She will call once her decision is     reached.            Carbon Copy:      Copies To 2:   KRISTIE VALLADARES ;            PREVENTION      Hx Influenza Vaccination:  Yes (2016)      Date Influenza Vaccine Given:  Oct 17, 2017      Influenza Vaccine Declined:  No      Hx Pneumococcal Vaccination:  Yes (2015)      Encouraged to follow-up with:  PCP regarding preventative exams.                 Disclaimer: Converted document may not contain table formatting or lab diagrams. Please see [email or web address] for the authenticated document.

## 2021-05-28 NOTE — PROGRESS NOTES
Patient: YUSUF ORELLANA     Acct: [id number]     Report: #[id number]  UNIT #: [id number]     : 1935    Encounter Date:2018  PRIMARY CARE:   ***Signed***  --------------------------------------------------------------------------------------------------------------------  Progress Note      DATE: 18      Primary Care Provider:  ELDA YEPEZ      Referring Provider:  ELDA YEPEZ      Chief Complaint       FU  adenocarcinoma, left lung status post left lower lobectomy.            Subjective      83-year-old white female with history of adenocarcinoma of the lung left side     status post post left left lower lobectomy last 2018.   Patient had     one perihilar lymph node positive and  agreed to get adjuvant chemotherapy.      Patient is familiar with chemotherapy because in May 2017, patient had diffuse     large B-cell lymphoma that was treated with chemotherapy and was a complete     response.            Patient is feeling much better since last visit at which time she has had fever     and sore throat.  Patient was given antibiotics  from the emergency room.            Past Med/Surg History            Past Med/Surg History:   Hypertension             No Diabetes Mellitus             Heart Disease (coronary artery disease with stent placement)             No Blood Clots             Cancer (skin cancers)             No Lung Disease             No Kidney Disease             Other (hyperlipidemia)            Social History      Social History:  No Tobacco Use, No Alcohol Use, No Recreational Drug use            Allergies      Coded Allergies:             CHOLESTYRAMINE (Unverified  Allergy, Unknown, 18)           CIPROFLOXACIN (Unverified  Allergy, Unknown, 18)           NITROFURANTOIN (Unverified  Allergy, Unknown, 18)           SUCROSE (Unverified  Allergy, Unknown, 18)            Medications      Medications    Last Reconciled on 18 10:01 by AYAKA POLO  MD      Ondansetron HCl (Zofran*) 4 Mg Tablet      4 MG PO Q4H PRN for NAUSEA AND/OR VOMITING for 10 Days, #60 TAB 2 Refills         Prov: Lida Sherman         11/29/18       Abbe Cit/Mag/D3/Zn//Owen/Bor (Citracal-D + Magnesium Tab) 1 Tab Tablet      1 EACH PO QDAY for 30 Days, #30 TAB         Reported         5/3/18       Glucosamine HCl (Glucosamine HCl) 500 Mg Tablet      1000 MG PO QDAY, TAB         Reported         5/22/17       Sennosides/Docusate Sod 8.6/50 MG (Senokot-S) 1 Each Tablet      2 TAB PO QDAY, #60 TAB         Reported         5/11/17       Polyethylene Glycol (Miralax*) 17 Gm Packet      17 GM PO QDAY, #30 PKT 0 Refills         Reported         5/11/17       Cholecalciferol (Vitamin D3*) 1,000 Unit Tab      1000 UNITS PO QDAY, #30 TAB 0 Refills         Reported         5/11/17       Magnesium Oxide (Magnesium Oxide) 500 Mg Capsule      2 TAB PO QDAY, CAP         Reported         5/11/17       Tramadol (Tramadol) 50 Mg Tablet      50 MG PO QID for PAIN, TAB 0 Refills         Reported         1/29/15       Sucralfate (Carafate) 1,000 Mg/10 Ml Oral.susp      1000 MG PO HS, #300 ML         Reported         1/29/15       Carvedilol (Coreg) 6.25 Mg Tablet      6.25 MG PO BID, #60 TAB 0 Refills         Reported         1/29/15       Gemfibrozil (Gemfibrozil*) 600 Mg Tablet      600 MG PO BID, TAB         Reported         1/29/15       Ezetimide (Zetia) 10 Mg Tablet      10 MG PO QDAY, #30 TAB 0 Refills         Reported         1/29/15       Pantoprazole (Protonix*) 40 Mg Tablet.dr      40 MG PO QDAY@07, #30 TAB 0 Refills         Reported         1/29/15      Current Medications      Current Medications Reviewed 12/3/18       RADHA Araujo            Pain Assessment      Pain Intensity:  4            Review of Systems      General:  Fatigue Scale: (4), Pain Scale: (4)      HEENT:  No Dysphagia, No Hearing Changes      Respiratory:  No Cough, No Shortness of Air      Cardiovascular:  No Chest Pain, No Pedal  Edema      Gastrointestinal:  No Nausea, No Vomiting; Constipation, Appetite Fair (fair)      Genitourinary:  No Nocturia, No Dysuria      Musculoskeletal:  No Joint Effusions, No Joint Tenderness      Endocrine:  No Heat Intolerance, No Cold Intolerance      Hematologic:  No Bleeding, No Bruising      Allergic/Immunologic:  No Hives, No Throat closing off      Psychological:  No Anxiety, No Depression      Neurological:  No Headaches, No Dizziness; Weakness      Skin:  No Rash, No Open Wounds      Review of Systems      K Araujo            Vitals      Weight 131 lbs 2 oz / 59.266975 kg      Temperature 98.2 F / 36.78 C      Pulse 80      Respirations 18      Blood Pressure 118/68      Pulse Oximetry 97%            Exam      Constitutional:  No acute distress, Conversant, Pleasant      Eyes:  Anicteric sclerae, Palpebral Conjunctivae (Pink), KELLY      HENT:  Oropharynx clear; No Erythema; Buccal mucosae (Pain)      Neck:  Supple, Full Range of Motion; No Masses      Cardiovascular:  RRR; No Murmurs; Normal PMI; No Peripheral Edema      Lungs:  Clear to Ausculation, Normal Respiratory Effort; No Rhonchi      Abdomen:  Soft, NABS; No Masses, No Tenderness      Chest:  Other (Symmetrical and equal)      Lymphatic:  No Neck, No Axillae      Extremities:  No digital cyanosis, No digital ischemia, Normal gait, Other (No     deformity)      Neurological:  Cranial Nerve II-XII; No Focal Sensory deficits      Psychological:  Appropriate affect, Appropriate mood, Intact judgement, Alert      Skin:  Normal temperature, Other (No dermatosis)            Lab Results      White count 5.4, hemoglobin 11.4, hematocrit 33.6, platelet count 305,000,     normal differential      CMP normal except for glucose of 118            Impression/Problem List      Diffuse large B-cell lymphoma involving the right mandible complete response to     6 courses of R CHOP no evidence of disease      Hypertension      Hyperlipidemia      Coronary  artery disease      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 on ac    tive chemotherapy      Notes      New Medications      * Ondansetron HCl (Zofran*) 4 MG TABLET: 4 MG PO Q4H PRN NAUSEA AND/OR VOMITING       10 Days #60            Plan      1.  Adenocarcinoma of the left lung stage III status post left lower     lobectomy-on Alimta and carboplatin      Return to clinic in 2 weeks with a CBC      Given prescription for Zofran      2.  Diffuse large B-cell lymphoma status post chemotherapy with complete     response, no evidence of disease-continue follow-up            Patient Education:        Lung Cancer            PREVENTION      Hx Influenza Vaccination:  Yes (2016)      Date Influenza Vaccine Given:  Oct 17, 2017      Influenza Vaccine Declined:  No      2 or More Falls Past Year?:  No      Fall Past Year with Injury?:  No      Hx Pneumococcal Vaccination:  Yes (2015)      Encouraged to follow-up with:  PCP regarding preventative exams.                 Disclaimer: Converted document may not contain table formatting or lab diagrams. Please see [employer] [facility] for the authenticated document.

## 2021-05-28 NOTE — PROGRESS NOTES
Patient: YUSUF ORELLANA     Acct: [id number]     Report: #[id number]  UNIT #: [id number]     : 1935    Encounter Date:01/10/2019  PRIMARY CARE:   ***Signed***  --------------------------------------------------------------------------------------------------------------------  DATE: 1/10/19      Primary Care Provider      Primary Care Provider:  BRADFORD YEPEZ            Referring Physician      Referring Provider:  BRADFORD YEPEZ            Chief Complaint      FU NHL, Adenocarcinoma, (L) Lower lung lobectomy            Subjective      83-year-old white female was diagnosed in 2018 with adenocarcinoma of    the lung left side status post left lower lobe ectomy.  She had one perihilar     lymph node positive.  Patient was offered adjuvant chemotherapy and was given 1     course of carboplatin and Alimta in 2018.  Unfortunately the     patient did not tolerate the treatment. She had nausea and diarrhea and achiness    all over.  She  developed swelling of her feet and legs.  She gained 11 pounds     which is probably fluid weight.  She had rashes on her arms and legs.  She was     given diuretics and echocardiogram was ordered.   She was also followed up by     Dr. Bradford Yepez and according to her she was diagnosed with new onset conges    tive heart failure.  An EKG has been ordered.  She had a dermatology appointment    but this is in the future.              She was seen in my office on  and during that visit she was feeling a    little bit better,  still had some swelling in her legs and some scattered     rashes on the legs and arms.  She had a dry ulcer of the left leg.  Her Lasix     and potassium was continued.  Chemotherapy was put on hold.            Today she is some better but not back to her usual self.  She has fatigue 5 out     of 10 and pain 4 out of 10 as well as weakness.            Past Med/Surg History            Past Med/Surg History:   Hypertension              No Diabetes Mellitus             Heart Disease (coronary artery disease with stent placement)             No Blood Clots             Cancer (skin cancers)             No Lung Disease             No Kidney Disease             Other (hyperlipidemia)            Social History      Social History:  No Tobacco Use, No Alcohol Use, No Recreational Drug use            Allergies      Coded Allergies:             CHOLESTYRAMINE (Unverified  Allergy, Unknown, 4/19/18)           CIPROFLOXACIN (Unverified  Allergy, Unknown, 4/19/18)           NITROFURANTOIN (Unverified  Allergy, Unknown, 4/19/18)           SUCROSE (Unverified  Allergy, Unknown, 4/19/18)            Medications      Medications    Last Reconciled on 1/11/19 12:48 by AYAKA SHERMAN MD      Nitroglycerin (Nitromist 400 MCG/SPRAY) Unknown Strength Spray               Reported         1/10/19       Furosemide (Furosemide) 20 Mg Tablet      20 MG PO QDAY for 90 Days, #90 TAB 1 Refill         Prov: Lida Sherman         12/20/18       Potassium Chloride (K-Tab*) 10 Meq Tablet.er      10 MEQ PO BID for 60 Days, #120 TAB 1 Refill         Prov: Lida Shermna         12/20/18       MDI-Albuterol (Proair HFA) 8.5 Gm Hfa.aer.ad      2 PUFFS INH RTQID PRN for SHORTNESS OF BREATH, #1 MDI 0 Refills         Reported         12/20/18       Acetaminophen (Tylenol) 325 Mg Tablet      650 MG PO Q4H PRN for PAIN OR FEVER, #100 TAB 0 Refills         Reported         12/13/18       Folic Acid (Folic Acid*) 0.4 Mg Tablet      400 MCG PO QDAY, TAB         Reported         12/13/18       Nitroglycerin (Nitroglycerin 0.8 MG/HR) 1 Each Patch.td24      1 PATCH TRANSDERM QDAY, PATCH         Reported         12/13/18       Aspirin Chew (Aspirin Baby) 81 Mg Tab.chew      81 MG PO QDAY, #30 TAB.CHEW 0 Refills         Reported         12/13/18       Cholecalciferol (Vitamin D*) 2,000 Unit Cap      1000 UNITS PO QDAY, #60 CAP 0 Refills         Reported         12/13/18       Zolpidem Tartrate  (Zolpidem Tartrate*) 5 Mg Tablet      5 MG PO HS PRN for SLEEP for 30 Days, #30 TAB 0 Refills         Reported         12/13/18       Ondansetron HCl (Zofran*) 4 Mg Tablet      4 MG PO Q4H PRN for NAUSEA AND/OR VOMITING for 10 Days, #60 TAB 2 Refills         Prov: Lida Sherman         11/29/18       Abbe Cit/Mag/D3/Zn//Owen/Bor (Citracal-D + Magnesium Tab) 1 Tab Tablet      1 EACH PO QDAY for 30 Days, #30 TAB         Reported         5/3/18       Glucosamine HCl (Glucosamine HCl) 500 Mg Tablet      1000 MG PO QDAY, TAB         Reported         5/22/17       Sennosides/Docusate Sod 8.6/50 MG (Senokot-S) 1 Each Tablet      2 TAB PO QDAY, #60 TAB         Reported         5/11/17       Polyethylene Glycol (Miralax*) 17 Gm Packet      17 GM PO QDAY, #30 PKT 0 Refills         Reported         5/11/17       Cholecalciferol (Vitamin D3*) 1,000 Unit Tab      1000 UNITS PO QDAY, #30 TAB 0 Refills         Reported         5/11/17       Magnesium Oxide (Magnesium Oxide) 500 Mg Capsule      2 TAB PO QDAY, CAP         Reported         5/11/17       Tramadol (Tramadol) 50 Mg Tablet      50 MG PO QID for PAIN, TAB 0 Refills         Reported         1/29/15       Sucralfate (Carafate) 1,000 Mg/10 Ml Oral.susp      1000 MG PO HS, #300 ML         Reported         1/29/15       Carvedilol (Coreg) 6.25 Mg Tablet      6.25 MG PO BID, #60 TAB 0 Refills         Reported         1/29/15       Gemfibrozil (Gemfibrozil*) 600 Mg Tablet      600 MG PO BID, TAB         Reported         1/29/15       Ezetimide (Zetia) 10 Mg Tablet      10 MG PO QDAY, #30 TAB 0 Refills         Reported         1/29/15       Pantoprazole (Protonix*) 40 Mg Tablet.dr      40 MG PO QDAY@07, #30 TAB 0 Refills         Reported         1/29/15      Current Medications      Current Medications Reviewed 1/10/19            Pain Assessment      Pain Intensity:  4            Review of Systems      General:  No Anxiety; Fatigue Scale: (5), Pain Scale: (4)      HEENT:  No  Dysphagia      Respiratory:  No Cough, No Shortness of Air      Cardiovascular:  No Chest Pain, No Pedal Edema      Gastrointestinal:  No Nausea, No Vomiting; Appetite Fair (fair)      Genitourinary:  No Nocturia, No Dysuria      Musculoskeletal:  No Joint Effusions, No Joint Tenderness      Endocrine:  No Heat Intolerance, No Cold Intolerance      Hematologic:  No Bleeding, No Bruising      Allergic/Immunologic:  No Hives, No Throat closing off      Psychological:  No Anxiety, No Depression      Neurological:  Headaches; No Dizziness; Weakness      Skin:  No Rash, No Open Wounds            Vitals      Height 5 ft 3 in / 160.02 cm      Weight 136 lbs 14.4 oz / 62.765261 kg      BSA 1.65 m2      BMI 24.3 kg/m2      Temperature 98.5 F / 36.94 C      Pulse 88      Respirations 20      Blood Pressure 161/84      Pulse Oximetry 100%            Exam      Constitutional:  No acute distress, Conversant, Pleasant, Weakness (Mild)      Eyes:  Anicteric sclerae, Palpebral Conjunctivae (Pink), KELLY      HENT:  Oropharynx clear; No Erythema; Buccal mucosae (Pink)      Neck:  Supple, Full Range of Motion      Cardiovascular:  RRR; No Murmurs; Normal PMI; No Peripheral Edema      Lungs:  Clear to Ausculation, Normal Respiratory Effort      Abdomen:  Soft, NABS      Chest:  Other (Symmetrical)      Lymphatic:  No Neck      Extremities:  No digital cyanosis, Normal gait, Other (No deformed)      Neurological:  Cranial Nerve II-XII (Intact); No Focal Sensory deficits      Psychological:  Appropriate affect, Appropriate mood, Intact judgement, Alert      Skin:  Other (Rashes at this time are not red.  She said she has this discolored    area in her arms in the past..)            Impression/Problem List      Adenocarcinoma of the lung status post left lower lobectomy finished 1 course of    Alimta and carboplatin November 2018      Diffuse large B-cell lymphoma involving the right mandible complete response to     6 courses of R CHOP  diagnosed in May 2017      Hypertension      Hyperlipidemia      Coronary artery disease      COPD      Notes      New Medications      * NITROGLYCERIN (Nitromist 400 MCG/SPRAY) Unknown Strength SPRAY:          Replaced Nicotine (Nicotrol) Unknown Strength SPRAY:         IH QDAY PRN CHEST PAIN            Plan      1.  Adenocarcinoma of the left lung stage III status post left lower lobectomy.     Discussion about further chemotherapy was done.  Patient together with her ca    regiver had agreed to defer further chemotherapy.  Patient claims this is the     best way to die.Patient is due for CT chest this month      2.  Diffuse large B-cell lymphoma status post chemotherapy with complete     response, no evidence of disease-continue follow-up      3.  Follow-up in 3 months with a CBC, CMP, LDH, CA      4.-Follow-up with dermatology for rashes            Patient Education:        DI for Non-Hodgkin's Lymphoma            PREVENTION      Hx Influenza Vaccination:  Yes (2016)      Date Influenza Vaccine Given:  Oct 17, 2017      Influenza Vaccine Declined:  No      2 or More Falls Past Year?:  No      Fall Past Year with Injury?:  No      Hx Pneumococcal Vaccination:  Yes (2015)      Encouraged to follow-up with:  PCP regarding preventative exams.      Chart initiated by      RADHA Araujo MA                 Disclaimer: Converted document may not contain table formatting or lab diagrams. Please see [facility] for the authenticated document.

## 2021-05-28 NOTE — PROGRESS NOTES
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  Patient received chemotherapy     consisting of Cytoxan and Adriamycin Oncovin and Rituxan.  Patient had received     6 courses without any untoward side effects.            Patient is here for a checkup she claims she's doing well but started     complaining of neuropathy of her legs and feet.  She also complains of achiness     in her upper arms.  She has a history of plantar fasciitis.            Past Med/Surg History            Past Med/Surg History:   Hypertension             No Diabetes Mellitus             Heart Disease (coronary artery disease with stent placement)             No Blood Clots             Cancer (skin cancers)             No Lung Disease             No Kidney Disease             Other (hyperlipidemia)            Social History      Social History:  No Tobacco Use, No Alcohol Use, No Recreational Drug use            Allergies      Coded Allergies:             CHOLESTYRAMINE (Unverified  Allergy, Unknown, 18)           CIPROFLOXACIN (Unverified  Allergy, Unknown, 18)           NITROFURANTOIN (Unverified  Allergy, Unknown, 18)           SUCROSE (Unverified  Allergy, Unknown, 18)            Medications      Medications    Last Reconciled on 18 14:51 by AYAKA POLO MD      Cholestyramine (Cholestyramine*) 4 Gm Powd.pack      4 GM PO BID, #60  PACKET         Reported         5/3/18       Ciprofloxacin Hcl (Ciprofloxacin*) 250 Mg Tablet      250 MG PO BID, TAB 0 Refills         Reported         5/3/18       Abbe Cit/Mag/D3/Zn//Owen/Bor (Citracal-D + Magnesium Tab) 1 Tab Tablet      1 EACH PO QDAY for 30 Days, #30 TAB         Reported         5/3/18       Glucosamine HCl (Glucosamine HCl) 500 Mg Tablet      1000 MG PO QDAY, TAB         Reported         5/22/17       Acetaminophen/Hydrocodone 10/325* (Hydrocodone/Acetaminophen 10/325*) 1 Each     Tablet      1 TAB PO Q6H Y for PAIN, TAB         Reported         5/11/17       Sennosides/Docusate Sod 8.6/50 MG (Senokot-S) 1 Each Tablet      2 TAB PO QDAY, #60 TAB         Reported         5/11/17       Polyethylene Glycol (Miralax*) 17 Gm Packet      17 GM PO QDAY, #30 PKT 0 Refills         Reported         5/11/17       Cholecalciferol (Vitamin D3*) 1,000 Unit Tab      1000 UNITS PO QDAY, #30 TAB 0 Refills         Reported         5/11/17       Magnesium Oxide (Magnesium Oxide) 500 Mg Capsule      2 TAB PO QDAY, CAP         Reported         5/11/17       Nitroglycerin (Nitromist 400 MCG/SPRAY*) 8.5 Gm Spray      0.4 MG SL ASDIR, SPRAYS         Reported         5/11/17       Zolpidem Tartrate (Ambien) 5 Mg Tablet      5 MG PO HS, #30 TAB         Reported         1/29/15       Tramadol (Tramadol) 50 Mg Tablet      50 MG PO QID for PAIN, TAB 0 Refills         Reported         1/29/15       Aspirin (Bureau Aspirin*) 81 Mg Tab.chew      81 MG PO QDAY, TAB.CHEW         Reported         1/29/15       Sucralfate (Carafate) 1,000 Mg/10 Ml Oral.susp      1000 MG PO HS, #300 ML         Reported         1/29/15       Carvedilol (Coreg) 6.25 Mg Tablet      6.25 MG PO BID, #60 TAB 0 Refills         Reported         1/29/15       Gemfibrozil (Gemfibrozil*) 600 Mg Tablet      600 MG PO BID, TAB         Reported         1/29/15       Ezetimide (Zetia) 10 Mg Tablet      10 MG PO QDAY, #30 TAB 0 Refills         Reported          1/29/15       Pantoprazole (Protonix*) 40 Mg Tablet.dr      40 MG PO QDAY@07, #30 TAB 0 Refills         Reported         1/29/15      Current Medications      Current Medications Reviewed 4/19/18            Pain Assessment      Pain Intensity:  3 (joints and legs)      Duration:  Intermittent            Review of Systems      General:  No Anxiety, Fatigue Scale: (3), Pain Scale: (3), No Fever, No Other      HEENT:  No Dysphagia, No Hearing Changes, No Rhinorrhea, No Tinnitus, No Visual     Changes, No Nasal Congestion, No Epistaxis, No Other      Respiratory:  No Cough, No Shortness of Air, No Sputum Changes, No Wheezing, No     Hemoptysis, No Congestion, No Other      Cardiovascular:  No Chest Pain, No Pedal Edema, No Orthopnea, No Palpitations,     No Chest Pressure, No Dizziness, No Other      Gastrointestinal:  No Nausea, No Vomiting, No Dysphagia, Constipation, No     Diarrhea, Appetite Good, No Appetite Fair, No Appetite Poor, No Early Satiety,     No Other      Genitourinary:  Nocturia (2 or 3), No Dysuria, No Other      Musculoskeletal:  No Joint Effusions, Joint Tenderness, Joint Stiffness, No     Myalgias, Aches, Pains, No Other      Endocrine:  No Heat Intolerance, No Cold Intolerance, No Fatigue, No Blood     Sugar Control, No Other      Hematologic:  Bleeding, Bruising, No Swollen Glands, No Other      Allergic/Immunologic:  No Hives, No Throat closing off, No Nasal drip, No Itchy     eyes, No Hay fever, No Other      Psychological:  No Anxiety, Depression (mild), No Other      Neurological:  Headaches, Dizziness, No Weakness, No Numbness, No Other      Skin:  No Rash, No Open Wounds, No Edema, No Other            Vitals      Height 5 ft 3 in / 160.02 cm      Weight 129 lbs 5 oz / 58.293536 kg      BSA 1.62 m2      BMI 22.9 kg/m2      Temperature 98.4 F / 36.89 C - Temporal      Pulse 84      Blood Pressure 119/64 Sitting, Left Arm      Pulse Oximetry 97%, room air            Exam       Constitutional:  No acute distress, Conversant, Pleasant      Eyes:  Anicteric sclerae, Palpebral Conjunctivae (pink), KELLY, Other (glasses)      HENT:  Oropharynx clear, No Erythema, No Tonsils, Buccal mucosae (pink), Other (    no masses noted)      Neck:  Supple, Full Range of Motion      Cardiovascular:  RRR, No Murmurs, Normal PMI, No Peripheral Edema      Lungs:  Clear to Ausculation, Normal Respiratory Effort      Abdomen:  Soft, NABS, No Masses, No Tenderness      Chest:  Other (symmetrical and equal kyphotic)      Lymphatic:  No Neck, Axillae (left axilla about 2 cm movable nontender)      Extremities:  No digital cyanosis, Normal gait, Other (no deformity no     limitation in range of motion)      Neurological:  Cranial Nerve II-XII, No Focal Sensory deficits      Psychological:  Appropriate affect, Intact judgement, Alert      Skin:  Normal temperature, Other (no dermatosis)            Lab Results      Hemoglobin 11.5 hematocrit 34.4, white count 4, platelet count is 327,000            Sedimentation rate 42            Impression/Problem List      Diffuse large B-cell lymphoma involving the right mandible possible recurrence     with left axillary adenopathy      Hypertension      Hyperlipidemia      Coronary artery disease            Plan            PET CT scan            Patient Education:        DI for Non-Hodgkin's Lymphoma      High Blood Pressure            PREVENTION      Hx Influenza Vaccination:  Yes (2016)      Date Influenza Vaccine Given:  Oct 17, 2017      Influenza Vaccine Declined:  No      2 or More Falls Past Year?:  No      Fall Past Year with Injury?:  No      Hx Pneumococcal Vaccination:  Yes (2015)      Encouraged to follow-up with:  PCP regarding preventative exams.      Chart initiated by      Nikki Aguilar MA                 Disclaimer: Converted document may not contain table formatting or lab diagrams. Please see [employer] [facility] for the authenticated  document.

## 2021-05-28 NOTE — PROGRESS NOTES
"Patient: YUSUF ORELLANA     Acct: [id number]     Report: #[id number]  UNIT #: [id number]     : 1935    Encounter Date:2018  PRIMARY CARE:   ***Signed***  --------------------------------------------------------------------------------------------------------------------  DATE: 18      Primary Care Provider      Primary Care Provider:  ELDA YEPEZ            Referring Physician      Referring Provider:  ELDA YEPEZ            Chief Complaint      FU NHL, Adenocarcinoma, (L) lung status post left lower lobectomy            Subjective      83-year-old white female was recently diagnosed with adenocarcinoma of the lung     status post left lower lobectomy.  Patient had radha involvement and was offered    chemotherapy.  Patient received carboplatin and Alimta last 2018.      Patient comes in for follow-up.  Patient claims that she has been sick since her    chemotherapy.  Day after chemo patient had diarrhea was unable to walk and was     hurting all over.  She describes this as \"terrible \".  Patient had gained 11     pounds and her feet and legs are swollen.  She also has an itchy rash on arms     and legs.      Patient was seen by Dr. Yepez and was given Cleocin 300 mg 3 times a day and     Eucerin cream.            Past Med/Surg History            Past Med/Surg History:   Hypertension             No Diabetes Mellitus             Heart Disease (coronary artery disease with stent placement)             No Blood Clots             Cancer (skin cancers)             No Lung Disease             No Kidney Disease             Other (hyperlipidemia)            Social History      Social History:  No Tobacco Use, No Alcohol Use, No Recreational Drug use            Allergies      Coded Allergies:             CHOLESTYRAMINE (Unverified  Allergy, Unknown, 18)           CIPROFLOXACIN (Unverified  Allergy, Unknown, 18)           NITROFURANTOIN (Unverified  Allergy, Unknown, " 4/19/18)           SUCROSE (Unverified  Allergy, Unknown, 4/19/18)            Medications      Medications    Last Reconciled on 11/28/18 10:01 by AYAKA SHERMAN MD      Acetaminophen (Tylenol) 325 Mg Tablet      650 MG PO Q4H PRN for PAIN OR FEVER, #100 TAB 0 Refills         Reported         12/13/18       Folic Acid (Folic Acid*) 0.4 Mg Tablet      400 MCG PO QDAY, TAB         Reported         12/13/18       Nitroglycerin (Nitroglycerin 0.8 MG/HR) 1 Each Patch.td24      1 PATCH TRANSDERM QDAY, PATCH         Reported         12/13/18       Aspirin Chew (Aspirin Baby) 81 Mg Tab.chew      81 MG PO QDAY, #30 TAB.CHEW 0 Refills         Reported         12/13/18       Cholecalciferol (Vitamin D*) 2,000 Unit Cap      1000 UNITS PO QDAY, #60 CAP 0 Refills         Reported         12/13/18       Nicotine (Nicotrol) Unknown Strength Spray      IH QDAY PRN PRN for CHEST PAIN, EACH         Reported         12/13/18       Zolpidem Tartrate (Zolpidem Tartrate*) 5 Mg Tablet      5 MG PO HS PRN for SLEEP for 30 Days, #30 TAB 0 Refills         Reported         12/13/18       Ondansetron HCl (Zofran*) 4 Mg Tablet      4 MG PO Q4H PRN for NAUSEA AND/OR VOMITING for 10 Days, #60 TAB 2 Refills         Prov: Lida Sherman         11/29/18       Abbe Cit/Mag/D3/Zn//Owen/Bor (Citracal-D + Magnesium Tab) 1 Tab Tablet      1 EACH PO QDAY for 30 Days, #30 TAB         Reported         5/3/18       Glucosamine HCl (Glucosamine HCl) 500 Mg Tablet      1000 MG PO QDAY, TAB         Reported         5/22/17       Sennosides/Docusate Sod 8.6/50 MG (Senokot-S) 1 Each Tablet      2 TAB PO QDAY, #60 TAB         Reported         5/11/17       Polyethylene Glycol (Miralax*) 17 Gm Packet      17 GM PO QDAY, #30 PKT 0 Refills         Reported         5/11/17       Cholecalciferol (Vitamin D3*) 1,000 Unit Tab      1000 UNITS PO QDAY, #30 TAB 0 Refills         Reported         5/11/17       Magnesium Oxide (Magnesium Oxide) 500 Mg Capsule      2 TAB PO QDAY,  CAP         Reported         5/11/17       Tramadol (Tramadol) 50 Mg Tablet      50 MG PO QID for PAIN, TAB 0 Refills         Reported         1/29/15       Sucralfate (Carafate) 1,000 Mg/10 Ml Oral.susp      1000 MG PO HS, #300 ML         Reported         1/29/15       Carvedilol (Coreg) 6.25 Mg Tablet      6.25 MG PO BID, #60 TAB 0 Refills         Reported         1/29/15       Gemfibrozil (Gemfibrozil*) 600 Mg Tablet      600 MG PO BID, TAB         Reported         1/29/15       Ezetimide (Zetia) 10 Mg Tablet      10 MG PO QDAY, #30 TAB 0 Refills         Reported         1/29/15       Pantoprazole (Protonix*) 40 Mg Tablet.dr      40 MG PO QDAY@07, #30 TAB 0 Refills         Reported         1/29/15      Current Medications      Current Medications Reviewed 12/13/18       RADHA Araujo MA            Pain Assessment      Pain Intensity:  4            Review of Systems      General:  Anxiety, Fatigue Scale: (5), Pain Scale: (4)      Respiratory:  Cough, Shortness of Air, Sputum Changes (clear)      Gastrointestinal:  Nausea, Diarrhea, Appetite Fair (fair)      Musculoskeletal:  Pains (Hurting all over), Other (swelling in feet and ankles)      Neurological:  Dizziness, Weakness      Skin:  Rash (Itchy rash on arms and legs)      Review of Systems      RADHA Araujo MA            Vitals      Height 5 ft 3 in / 160.02 cm      Weight 142 lbs 9.6 oz / 64.604015 kg      BSA 1.67 m2      BMI 25.3 kg/m2      Temperature 97.6 F / 36.44 C      Pulse 80      Respirations 18      Blood Pressure 148/80      Pulse Oximetry 92%            Exam      Constitutional:  No No acute distress; Conversant, Weakness      Eyes:  Anicteric sclerae, Palpebral Conjunctivae (Pink), KELLY (Glasses)      HENT:  Oropharynx clear; No Erythema; Buccal mucosae (Pink), Other (Hard of     hearing)      Neck:  Supple, Full Range of Motion      Cardiovascular:  RRR; No Murmurs; Normal PMI, Peripheral Edema      Lungs:  Clear to Ausculation, Normal Respiratory  Effort; No Rhonchi, No     Crackles, No Wheezes      Abdomen:  Soft, NABS; No Masses, No Tenderness      Chest:  Other (Symmetrical and equal)      Lymphatic:  No Neck, No Axillae      Extremities:  Normal gait      Neurological:  Cranial Nerve II-XII (Intact); No Focal Sensory deficits      Psychological:  Appropriate affect, Appropriate mood, Intact judgement, Alert      Skin:  Other (Erythematous rash on both legs and arms and chest, pleuritic)            Lab Results      Test done on December 5 2 Martín showed hypokalemia and patient was given pota    ssium replacement.  BUN and creatinine were normal.  Patient's BNP was over     thousand.            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      Erythematous rashes      Weakness probably secondary to chemotherapy      Hard of hearing      Generalized aching may be secondary to Neulasta      Fluid overload, probable congestive heart failure with an elevated BNP      Notes      New Medications      * ZOLPIDEM TARTRATE (Zolpidem Tartrate*) 5 MG TABLET: 5 MG PO HS PRN SLEEP 30       Days #30      * Nicotine (Nicotrol) Unknown Strength SPRAY: IH QDAY PRN CHEST PAIN      * CHOLECALCIFEROL (Vitamin D*) 2,000 UNIT CAP: 1,000 UNITS PO QDAY #60      * Aspirin Chew (Aspirin Baby) 81 MG TAB.CHEW: 81 MG PO QDAY #30      * NITROGLYCERIN (Nitroglycerin 0.8 MG/HR) 1 EACH PATCH.TD24: 1 PATCH TRANSDERM       QDAY      * Folic Acid (Folic Acid*) 0.4 MG TABLET: 400 MCG PO QDAY      * ACETAMINOPHEN (Tylenol) 325 MG TABLET: 650 MG PO Q4H PRN PAIN OR FEVER #100      * POTASSIUM CHLORIDE (K-Tab*) 10 MEQ TABLET.ER: 10 MEQ PO QDAY      * Clindamycin HCl (Cleocin) 300  MG CAPSULE: 300 MG PO TID            Plan      Continue Lasix 20 mg every day and potassium chloride increased to twice a day.      Skin emollients to legs and arms.  May use hydrocortisone cream if no better.      Hold chemotherapy      Return to clinic in 1 week and do BNP, CBC, BMP,      Echocardiogram when patient is feeling much better.            Patient Education:        DI for Non-Hodgkin's Lymphoma            PREVENTION      Hx Influenza Vaccination:  Yes (2016)      Date Influenza Vaccine Given:  Oct 17, 2017      Influenza Vaccine Declined:  No      2 or More Falls Past Year?:  No      Fall Past Year with Injury?:  No      Hx Pneumococcal Vaccination:  Yes (2015)      Encouraged to follow-up with:  PCP regarding preventative exams.      Chart initiated by      RADHA Araujo MA                 Disclaimer: Converted document may not contain table formatting or lab diagrams. Please see [employer] [facility] for the authenticated document.